# Patient Record
Sex: FEMALE
[De-identification: names, ages, dates, MRNs, and addresses within clinical notes are randomized per-mention and may not be internally consistent; named-entity substitution may affect disease eponyms.]

---

## 2017-12-28 PROCEDURE — 87205 SMEAR GRAM STAIN: CPT | Performed by: EMERGENCY MEDICINE

## 2017-12-28 PROCEDURE — 87075 CULTR BACTERIA EXCEPT BLOOD: CPT | Performed by: EMERGENCY MEDICINE

## 2017-12-28 PROCEDURE — 87070 CULTURE OTHR SPECIMN AEROBIC: CPT | Performed by: EMERGENCY MEDICINE

## 2018-01-03 PROBLEM — K60.3 ANAL FISTULA: Status: ACTIVE | Noted: 2018-01-03

## 2018-07-23 ENCOUNTER — HOSPITAL (OUTPATIENT)
Dept: OTHER | Age: 43
End: 2018-07-23
Attending: EMERGENCY MEDICINE

## 2018-07-23 ENCOUNTER — DIAGNOSTIC TRANS (OUTPATIENT)
Dept: OTHER | Age: 43
End: 2018-07-23

## 2018-07-23 PROBLEM — F32.A DEPRESSIVE DISORDER: Status: ACTIVE | Noted: 2018-07-23

## 2018-07-23 LAB
AMPHETAMINES UR QL SCN>500 NG/ML: NEGATIVE
ANALYZER ANC (IANC): ABNORMAL
ANION GAP SERPL CALC-SCNC: 16 MMOL/L (ref 10–20)
APAP SERPL-MCNC: <2 MCG/ML (ref 10–30)
BARBITURATES UR QL SCN>200 NG/ML: NEGATIVE
BASOPHILS # BLD: 0 THOUSAND/MCL (ref 0–0.3)
BASOPHILS NFR BLD: 0 %
BENZODIAZ UR QL SCN>200 NG/ML: NEGATIVE
BUN SERPL-MCNC: 9 MG/DL (ref 6–20)
BUN/CREAT SERPL: 13 (ref 7–25)
BZE UR QL SCN>150 NG/ML: NEGATIVE
CALCIUM SERPL-MCNC: 8.4 MG/DL (ref 8.4–10.2)
CANNABINOIDS UR QL SCN>50 NG/ML: NEGATIVE
CHLORIDE: 108 MMOL/L (ref 98–107)
CO2 SERPL-SCNC: 23 MMOL/L (ref 21–32)
CREAT SERPL-MCNC: 0.69 MG/DL (ref 0.51–0.95)
DIFFERENTIAL METHOD BLD: ABNORMAL
EOSINOPHIL # BLD: 0.3 THOUSAND/MCL (ref 0.1–0.5)
EOSINOPHIL NFR BLD: 2 %
ERYTHROCYTE [DISTWIDTH] IN BLOOD: 14 % (ref 11–15)
ETHANOL SERPL-MCNC: 210 MG/DL
GLUCOSE SERPL-MCNC: 106 MG/DL (ref 65–99)
HEMATOCRIT: 46.5 % (ref 36–46.5)
HGB BLD-MCNC: 15.8 GM/DL (ref 12–15.5)
LYMPHOCYTES # BLD: 4.1 THOUSAND/MCL (ref 1–4.8)
LYMPHOCYTES NFR BLD: 21 %
MCH RBC QN AUTO: 30.9 PG (ref 26–34)
MCHC RBC AUTO-ENTMCNC: 34 GM/DL (ref 32–36.5)
MCV RBC AUTO: 91 FL (ref 78–100)
MONOCYTES # BLD: 0.8 THOUSAND/MCL (ref 0.3–0.9)
MONOCYTES NFR BLD: 4 %
NEUTROPHILS # BLD: 14.1 THOUSAND/MCL (ref 1.8–7.7)
NEUTROPHILS NFR BLD: 73 %
NEUTS SEG NFR BLD: ABNORMAL %
NRBC (NRBCRE): ABNORMAL
OPIATES UR QL SCN>300 NG/ML: NEGATIVE
PCP UR QL SCN>25 NG/ML: NEGATIVE
PLATELET # BLD: 310 THOUSAND/MCL (ref 140–450)
POTASSIUM SERPL-SCNC: 4.2 MMOL/L (ref 3.4–5.1)
RBC # BLD: 5.11 MILLION/MCL (ref 4–5.2)
SALICYLATES SERPL-MCNC: 4.6 MG/DL
SODIUM SERPL-SCNC: 143 MMOL/L (ref 135–145)
WBC # BLD: 19.3 THOUSAND/MCL (ref 4.2–11)

## 2018-07-24 PROBLEM — T50.901A DRUG OVERDOSE: Status: ACTIVE | Noted: 2018-07-24

## 2018-07-24 PROBLEM — L40.9 PSORIASIS: Status: ACTIVE | Noted: 2018-07-24

## 2018-07-24 PROBLEM — F33.1 MODERATE EPISODE OF RECURRENT MAJOR DEPRESSIVE DISORDER (HCC): Status: ACTIVE | Noted: 2018-07-23

## 2018-07-24 PROBLEM — K61.1 PERI-RECTAL ABSCESS: Status: ACTIVE | Noted: 2017-01-01

## 2018-07-26 ENCOUNTER — PATIENT OUTREACH (OUTPATIENT)
Dept: CASE MANAGEMENT | Age: 43
End: 2018-07-26

## 2018-07-26 ENCOUNTER — PATIENT MESSAGE (OUTPATIENT)
Dept: CASE MANAGEMENT | Age: 43
End: 2018-07-26

## 2018-07-26 NOTE — PROGRESS NOTES
Name: John Randle       : 10/12/1975   Gender: female   Race: White   Ethnicity: NON  OR  OR  ETHNICITY   Employer Group:   None     Insurance Information:     [id number]   Medical Group Name: [address] [place] [address] Type:

## 2018-07-26 NOTE — PROGRESS NOTES
Name: Lazaro Zaidi       : 10/12/1975   Assessment obtained via: Telephone        CASE CLOSED DATE/ REASON FOR CLOSURE:      DIAGNOSIS/ TREATMENT:    Mental Health Diagnosis:  Depression Unspecified   Medical Comorbities:  None reported   Behavioral Hea total) by mouth nightly. Disp: 30 tablet Rfl: 0   TraZODone HCl 50 MG Oral Tab Take 1 tablet (50 mg total) by mouth nightly as needed for Sleep (insomnia).  Disp: 30 tablet Rfl: 0   Multiple Vitamins-Minerals (QUINCY MULTIVITAMIN FOR WOMEN) Oral Tab Take by m

## 2018-08-28 NOTE — PROGRESS NOTES
Name: Juarez Hughes       Employer Group:   None     Insurance Information:   [id number]     Referral Given: No   Medication adherence:  Yes   Goals/BARRIERS: 1. See Savanna Guo on 8/22/18-Met. Pt did see Lu Dyson on that date.  2.  See laila Escoto on 7/

## 2018-09-24 NOTE — PROGRESS NOTES
Called pt cell phone [phone] and spoke with her. She was at work and will be able to talk again this evening.

## 2018-09-25 NOTE — PROGRESS NOTES
Name: Milton Newman       Employer Group:   None     Insurance Information:   [id number]     Referral Given: No   Medication adherence:  Yes   Goals/BARRIERS: 1.  See Sourav Escoto twice per month. -Not met, only because pt went to her last appt and had a b

## 2018-12-05 PROBLEM — B35.1 TOENAIL FUNGUS: Status: ACTIVE | Noted: 2018-12-05

## 2018-12-05 PROBLEM — K60.3 ANAL FISTULA: Status: RESOLVED | Noted: 2018-01-03 | Resolved: 2018-12-05

## 2018-12-05 PROBLEM — K61.1 PERI-RECTAL ABSCESS: Status: RESOLVED | Noted: 2017-01-01 | Resolved: 2018-12-05

## 2018-12-05 PROBLEM — Z72.0 TOBACCO USE: Status: ACTIVE | Noted: 2018-12-05

## 2019-02-11 ENCOUNTER — APPOINTMENT (OUTPATIENT)
Dept: LAB | Age: 44
End: 2019-02-11
Attending: PHYSICIAN ASSISTANT
Payer: COMMERCIAL

## 2019-02-11 PROCEDURE — 82607 VITAMIN B-12: CPT | Performed by: PHYSICIAN ASSISTANT

## 2019-02-11 PROCEDURE — 36415 COLL VENOUS BLD VENIPUNCTURE: CPT | Performed by: PHYSICIAN ASSISTANT

## 2019-02-11 PROCEDURE — 82746 ASSAY OF FOLIC ACID SERUM: CPT | Performed by: PHYSICIAN ASSISTANT

## 2019-02-11 PROCEDURE — 82306 VITAMIN D 25 HYDROXY: CPT | Performed by: PHYSICIAN ASSISTANT

## 2019-03-20 PROBLEM — L40.0 PLAQUE PSORIASIS: Status: ACTIVE | Noted: 2018-07-24

## 2019-06-04 PROCEDURE — 88175 CYTOPATH C/V AUTO FLUID REDO: CPT | Performed by: OBSTETRICS & GYNECOLOGY

## 2019-07-19 PROCEDURE — 88175 CYTOPATH C/V AUTO FLUID REDO: CPT | Performed by: OBSTETRICS & GYNECOLOGY

## 2019-11-14 PROBLEM — S42.002A CLOSED DISPLACED FRACTURE OF LEFT CLAVICLE, UNSPECIFIED PART OF CLAVICLE, INITIAL ENCOUNTER: Status: ACTIVE | Noted: 2019-11-14

## 2019-11-14 PROBLEM — S42.022D CLOSED DISPLACED FRACTURE OF SHAFT OF LEFT CLAVICLE WITH ROUTINE HEALING, SUBSEQUENT ENCOUNTER: Status: ACTIVE | Noted: 2019-11-14

## 2019-12-11 PROBLEM — M25.612 DECREASED RANGE OF MOTION OF LEFT SHOULDER: Status: ACTIVE | Noted: 2019-12-11

## 2021-02-09 PROBLEM — G89.29 CHRONIC MIDLINE LOW BACK PAIN WITHOUT SCIATICA: Status: ACTIVE | Noted: 2021-02-09

## 2021-02-09 PROBLEM — S32.10XA CLOSED FRACTURE OF SACRUM, UNSPECIFIED PORTION OF SACRUM, INITIAL ENCOUNTER (HCC): Status: ACTIVE | Noted: 2021-02-09

## 2021-02-09 PROBLEM — M54.50 CHRONIC MIDLINE LOW BACK PAIN WITHOUT SCIATICA: Status: ACTIVE | Noted: 2021-02-09

## 2021-06-19 ENCOUNTER — IMMUNIZATION (OUTPATIENT)
Dept: LAB | Facility: HOSPITAL | Age: 46
End: 2021-06-19
Attending: EMERGENCY MEDICINE
Payer: COMMERCIAL

## 2021-06-19 DIAGNOSIS — Z23 NEED FOR VACCINATION: Primary | ICD-10-CM

## 2021-06-19 PROCEDURE — 0001A SARSCOV2 VAC 30MCG/0.3ML IM: CPT

## 2021-07-10 ENCOUNTER — IMMUNIZATION (OUTPATIENT)
Dept: LAB | Facility: HOSPITAL | Age: 46
End: 2021-07-10
Attending: EMERGENCY MEDICINE
Payer: COMMERCIAL

## 2021-07-10 DIAGNOSIS — Z23 NEED FOR VACCINATION: Primary | ICD-10-CM

## 2021-07-10 PROCEDURE — 0002A SARSCOV2 VAC 30MCG/0.3ML IM: CPT
